# Patient Record
Sex: MALE | Race: WHITE | NOT HISPANIC OR LATINO | Employment: OTHER | URBAN - METROPOLITAN AREA
[De-identification: names, ages, dates, MRNs, and addresses within clinical notes are randomized per-mention and may not be internally consistent; named-entity substitution may affect disease eponyms.]

---

## 2018-12-28 ENCOUNTER — OFFICE VISIT CONVERTED (OUTPATIENT)
Dept: UROLOGY | Facility: CLINIC | Age: 73
End: 2018-12-28
Attending: UROLOGY

## 2019-02-07 ENCOUNTER — HOSPITAL ENCOUNTER (OUTPATIENT)
Dept: ULTRASOUND IMAGING | Facility: HOSPITAL | Age: 74
Discharge: HOME | End: 2019-02-07
Attending: UROLOGY

## 2019-03-06 ENCOUNTER — OFFICE VISIT CONVERTED (OUTPATIENT)
Dept: UROLOGY | Facility: CLINIC | Age: 74
End: 2019-03-06
Attending: UROLOGY

## 2019-10-14 ENCOUNTER — CONVERSION ENCOUNTER (OUTPATIENT)
Dept: SURGERY | Facility: CLINIC | Age: 74
End: 2019-10-14

## 2019-10-14 ENCOUNTER — HOSPITAL ENCOUNTER (OUTPATIENT)
Dept: SURGERY | Facility: CLINIC | Age: 74
Discharge: HOME | End: 2019-10-14
Attending: PHYSICIAN ASSISTANT

## 2019-10-14 ENCOUNTER — OFFICE VISIT CONVERTED (OUTPATIENT)
Dept: SURGERY | Facility: CLINIC | Age: 74
End: 2019-10-14
Attending: PHYSICIAN ASSISTANT

## 2019-10-16 LAB — BACTERIA UR CULT: NORMAL

## 2019-11-08 ENCOUNTER — OFFICE VISIT CONVERTED (OUTPATIENT)
Dept: UROLOGY | Facility: CLINIC | Age: 74
End: 2019-11-08
Attending: UROLOGY

## 2019-12-04 ENCOUNTER — HOSPITAL ENCOUNTER (OUTPATIENT)
Dept: PREADMISSION TESTING | Facility: HOSPITAL | Age: 74
Discharge: HOME | End: 2019-12-04
Attending: UROLOGY

## 2020-01-07 ENCOUNTER — HOSPITAL ENCOUNTER (OUTPATIENT)
Dept: PERIOP | Facility: HOSPITAL | Age: 75
Setting detail: HOSPITAL OUTPATIENT SURGERY
Discharge: HOME | End: 2020-01-07
Attending: UROLOGY

## 2020-01-07 LAB
GLUCOSE BLD-MCNC: 125 MG/DL (ref 70–99)
GLUCOSE BLD-MCNC: 151 MG/DL (ref 70–99)

## 2020-01-13 ENCOUNTER — HOSPITAL ENCOUNTER (OUTPATIENT)
Dept: OTHER | Facility: HOSPITAL | Age: 75
Discharge: HOME | End: 2020-01-13
Attending: UROLOGY

## 2020-01-13 LAB
ANION GAP SERPL CALC-SCNC: 16 MMOL/L (ref 8–19)
BUN SERPL-MCNC: 14 MG/DL (ref 5–25)
BUN/CREAT SERPL: 15 {RATIO} (ref 6–20)
CALCIUM SERPL-MCNC: 9.5 MG/DL (ref 8.7–10.4)
CHLORIDE SERPL-SCNC: 97 MMOL/L (ref 99–111)
CONV CO2: 27 MMOL/L (ref 22–32)
CREAT UR-MCNC: 0.94 MG/DL (ref 0.7–1.2)
GFR SERPLBLD BASED ON 1.73 SQ M-ARVRAT: >60 ML/MIN/{1.73_M2}
GLUCOSE SERPL-MCNC: 139 MG/DL (ref 70–99)
OSMOLALITY SERPL CALC.SUM OF ELEC: 285 MOSM/KG (ref 273–304)
POTASSIUM SERPL-SCNC: 3.9 MMOL/L (ref 3.5–5.3)
SODIUM SERPL-SCNC: 136 MMOL/L (ref 135–147)

## 2020-01-14 ENCOUNTER — HOSPITAL ENCOUNTER (OUTPATIENT)
Dept: NUCLEAR MEDICINE | Facility: HOSPITAL | Age: 75
Discharge: HOME | End: 2020-01-14
Attending: UROLOGY

## 2020-01-14 LAB
ANION GAP SERPL CALC-SCNC: 17 MMOL/L (ref 8–19)
BUN SERPL-MCNC: 14 MG/DL (ref 5–25)
BUN/CREAT SERPL: 14 {RATIO} (ref 6–20)
CALCIUM SERPL-MCNC: 9.4 MG/DL (ref 8.7–10.4)
CHLORIDE SERPL-SCNC: 97 MMOL/L (ref 99–111)
CONV CO2: 25 MMOL/L (ref 22–32)
CREAT UR-MCNC: 1.01 MG/DL (ref 0.7–1.2)
GFR SERPLBLD BASED ON 1.73 SQ M-ARVRAT: >60 ML/MIN/{1.73_M2}
GLUCOSE SERPL-MCNC: 192 MG/DL (ref 70–99)
OSMOLALITY SERPL CALC.SUM OF ELEC: 286 MOSM/KG (ref 273–304)
POTASSIUM SERPL-SCNC: 4.3 MMOL/L (ref 3.5–5.3)
SODIUM SERPL-SCNC: 135 MMOL/L (ref 135–147)

## 2020-01-24 ENCOUNTER — OFFICE VISIT CONVERTED (OUTPATIENT)
Dept: UROLOGY | Facility: CLINIC | Age: 75
End: 2020-01-24
Attending: UROLOGY

## 2020-02-14 ENCOUNTER — OFFICE VISIT CONVERTED (OUTPATIENT)
Dept: SURGERY | Facility: CLINIC | Age: 75
End: 2020-02-14
Attending: SURGERY

## 2020-02-20 ENCOUNTER — HOSPITAL ENCOUNTER (OUTPATIENT)
Dept: PREADMISSION TESTING | Facility: HOSPITAL | Age: 75
Discharge: HOME | End: 2020-02-20
Attending: UROLOGY

## 2020-02-20 LAB
ALBUMIN SERPL-MCNC: 4.5 G/DL (ref 3.5–5)
ALBUMIN/GLOB SERPL: 1.5 {RATIO} (ref 1.4–2.6)
ALP SERPL-CCNC: 57 U/L (ref 56–155)
ALT SERPL-CCNC: 23 U/L (ref 10–40)
ANION GAP SERPL CALC-SCNC: 19 MMOL/L (ref 8–19)
AST SERPL-CCNC: 24 U/L (ref 15–50)
BASOPHILS # BLD AUTO: 0.04 10*3/UL (ref 0–0.2)
BASOPHILS NFR BLD AUTO: 0.5 % (ref 0–3)
BILIRUB SERPL-MCNC: 0.67 MG/DL (ref 0.2–1.3)
BUN SERPL-MCNC: 15 MG/DL (ref 5–25)
BUN/CREAT SERPL: 15 {RATIO} (ref 6–20)
CALCIUM SERPL-MCNC: 9.6 MG/DL (ref 8.7–10.4)
CHLORIDE SERPL-SCNC: 100 MMOL/L (ref 99–111)
CONV ABS IMM GRAN: 0.02 10*3/UL (ref 0–0.2)
CONV CO2: 24 MMOL/L (ref 22–32)
CONV IMMATURE GRAN: 0.3 % (ref 0–1.8)
CONV TOTAL PROTEIN: 7.6 G/DL (ref 6.3–8.2)
CREAT UR-MCNC: 1.02 MG/DL (ref 0.7–1.2)
DEPRECATED RDW RBC AUTO: 44 FL (ref 35.1–43.9)
EOSINOPHIL # BLD AUTO: 0.19 10*3/UL (ref 0–0.7)
EOSINOPHIL # BLD AUTO: 2.4 % (ref 0–7)
ERYTHROCYTE [DISTWIDTH] IN BLOOD BY AUTOMATED COUNT: 12.8 % (ref 11.6–14.4)
GFR SERPLBLD BASED ON 1.73 SQ M-ARVRAT: >60 ML/MIN/{1.73_M2}
GLOBULIN UR ELPH-MCNC: 3.1 G/DL (ref 2–3.5)
GLUCOSE SERPL-MCNC: 148 MG/DL (ref 70–99)
HCT VFR BLD AUTO: 41.2 % (ref 42–52)
HGB BLD-MCNC: 13.8 G/DL (ref 14–18)
INR PPP: 0.94 (ref 2–3)
LYMPHOCYTES # BLD AUTO: 2.45 10*3/UL (ref 1–5)
LYMPHOCYTES NFR BLD AUTO: 30.7 % (ref 20–45)
MCH RBC QN AUTO: 31.2 PG (ref 27–31)
MCHC RBC AUTO-ENTMCNC: 33.5 G/DL (ref 33–37)
MCV RBC AUTO: 93 FL (ref 80–96)
MONOCYTES # BLD AUTO: 0.7 10*3/UL (ref 0.2–1.2)
MONOCYTES NFR BLD AUTO: 8.8 % (ref 3–10)
NEUTROPHILS # BLD AUTO: 4.59 10*3/UL (ref 2–8)
NEUTROPHILS NFR BLD AUTO: 57.3 % (ref 30–85)
NRBC CBCN: 0 % (ref 0–0.7)
OSMOLALITY SERPL CALC.SUM OF ELEC: 292 MOSM/KG (ref 273–304)
PLATELET # BLD AUTO: 228 10*3/UL (ref 130–400)
PMV BLD AUTO: 10.3 FL (ref 9.4–12.4)
POTASSIUM SERPL-SCNC: 4.3 MMOL/L (ref 3.5–5.3)
PROTHROMBIN TIME: 10.3 S (ref 9.4–12)
RBC # BLD AUTO: 4.43 10*6/UL (ref 4.7–6.1)
SODIUM SERPL-SCNC: 139 MMOL/L (ref 135–147)
WBC # BLD AUTO: 7.99 10*3/UL (ref 4.8–10.8)

## 2020-02-22 LAB — BACTERIA UR CULT: NORMAL

## 2020-03-05 ENCOUNTER — HOSPITAL ENCOUNTER (OUTPATIENT)
Dept: PERIOP | Facility: HOSPITAL | Age: 75
Setting detail: HOSPITAL OUTPATIENT SURGERY
Discharge: HOME | End: 2020-03-06
Attending: UROLOGY

## 2020-03-05 LAB
ABO GROUP BLD: NORMAL
BLD GP AB SCN SERPL QL: NORMAL
CONV ABD CONTROL: NORMAL
GLUCOSE BLD-MCNC: 137 MG/DL (ref 70–99)
GLUCOSE BLD-MCNC: 175 MG/DL (ref 70–99)
GLUCOSE BLD-MCNC: 194 MG/DL (ref 70–99)
GLUCOSE BLD-MCNC: 204 MG/DL (ref 70–99)
GLUCOSE BLD-MCNC: 207 MG/DL (ref 70–99)
RH BLD: NORMAL

## 2020-03-06 LAB
ANION GAP SERPL CALC-SCNC: 18 MMOL/L (ref 8–19)
BASOPHILS # BLD AUTO: 0.03 10*3/UL (ref 0–0.2)
BASOPHILS NFR BLD AUTO: 0.2 % (ref 0–3)
BUN SERPL-MCNC: 11 MG/DL (ref 5–25)
BUN/CREAT SERPL: 11 {RATIO} (ref 6–20)
CALCIUM SERPL-MCNC: 8.9 MG/DL (ref 8.7–10.4)
CHLORIDE SERPL-SCNC: 97 MMOL/L (ref 99–111)
CONV ABS IMM GRAN: 0.05 10*3/UL (ref 0–0.2)
CONV CO2: 24 MMOL/L (ref 22–32)
CONV IMMATURE GRAN: 0.4 % (ref 0–1.8)
CREAT FLD-MCNC: 0.9 MG/DL
CREAT UR-MCNC: 1 MG/DL (ref 0.7–1.2)
DEPRECATED RDW RBC AUTO: 44.1 FL (ref 35.1–43.9)
EOSINOPHIL # BLD AUTO: 0 % (ref 0–7)
EOSINOPHIL # BLD AUTO: 0 10*3/UL (ref 0–0.7)
ERYTHROCYTE [DISTWIDTH] IN BLOOD BY AUTOMATED COUNT: 12.9 % (ref 11.6–14.4)
GFR SERPLBLD BASED ON 1.73 SQ M-ARVRAT: >60 ML/MIN/{1.73_M2}
GLUCOSE BLD-MCNC: 133 MG/DL (ref 70–99)
GLUCOSE BLD-MCNC: 144 MG/DL (ref 70–99)
GLUCOSE SERPL-MCNC: 159 MG/DL (ref 70–99)
HCT VFR BLD AUTO: 38.8 % (ref 42–52)
HGB BLD-MCNC: 13 G/DL (ref 14–18)
LYMPHOCYTES # BLD AUTO: 1.78 10*3/UL (ref 1–5)
LYMPHOCYTES NFR BLD AUTO: 14.3 % (ref 20–45)
MCH RBC QN AUTO: 31.2 PG (ref 27–31)
MCHC RBC AUTO-ENTMCNC: 33.5 G/DL (ref 33–37)
MCV RBC AUTO: 93 FL (ref 80–96)
MONOCYTES # BLD AUTO: 1.32 10*3/UL (ref 0.2–1.2)
MONOCYTES NFR BLD AUTO: 10.6 % (ref 3–10)
NEUTROPHILS # BLD AUTO: 9.26 10*3/UL (ref 2–8)
NEUTROPHILS NFR BLD AUTO: 74.5 % (ref 30–85)
NRBC CBCN: 0 % (ref 0–0.7)
OSMOLALITY SERPL CALC.SUM OF ELEC: 281 MOSM/KG (ref 273–304)
PLATELET # BLD AUTO: 239 10*3/UL (ref 130–400)
PMV BLD AUTO: 10.3 FL (ref 9.4–12.4)
POTASSIUM SERPL-SCNC: 5 MMOL/L (ref 3.5–5.3)
RBC # BLD AUTO: 4.17 10*6/UL (ref 4.7–6.1)
SODIUM SERPL-SCNC: 134 MMOL/L (ref 135–147)
SPECIMEN SOURCE FLD: NORMAL
WBC # BLD AUTO: 12.44 10*3/UL (ref 4.8–10.8)

## 2020-03-13 ENCOUNTER — OFFICE VISIT CONVERTED (OUTPATIENT)
Dept: UROLOGY | Facility: CLINIC | Age: 75
End: 2020-03-13
Attending: UROLOGY

## 2020-05-12 ENCOUNTER — TELEMEDICINE CONVERTED (OUTPATIENT)
Dept: UROLOGY | Facility: CLINIC | Age: 75
End: 2020-05-12
Attending: UROLOGY

## 2020-05-23 ENCOUNTER — HOSPITAL ENCOUNTER (OUTPATIENT)
Dept: URGENT CARE | Facility: CLINIC | Age: 75
Discharge: HOME | End: 2020-05-23
Attending: EMERGENCY MEDICINE

## 2020-08-21 ENCOUNTER — OFFICE VISIT CONVERTED (OUTPATIENT)
Dept: UROLOGY | Facility: CLINIC | Age: 75
End: 2020-08-21
Attending: UROLOGY

## 2020-09-24 ENCOUNTER — TELEPHONE CONVERTED (OUTPATIENT)
Dept: UROLOGY | Facility: CLINIC | Age: 75
End: 2020-09-24
Attending: UROLOGY

## 2021-01-15 ENCOUNTER — TELEPHONE CONVERTED (OUTPATIENT)
Dept: UROLOGY | Facility: CLINIC | Age: 76
End: 2021-01-15
Attending: UROLOGY

## 2021-04-12 ENCOUNTER — TELEPHONE CONVERTED (OUTPATIENT)
Dept: UROLOGY | Facility: CLINIC | Age: 76
End: 2021-04-12
Attending: UROLOGY

## 2021-04-19 ENCOUNTER — CONVERSION ENCOUNTER (OUTPATIENT)
Dept: GASTROENTEROLOGY | Facility: CLINIC | Age: 76
End: 2021-04-19

## 2021-04-19 ENCOUNTER — OFFICE VISIT CONVERTED (OUTPATIENT)
Dept: GASTROENTEROLOGY | Facility: CLINIC | Age: 76
End: 2021-04-19
Attending: NURSE PRACTITIONER

## 2021-05-11 NOTE — H&P
History and Physical      Patient Name: Romel Pérez   Patient ID: [id number]   Sex: Male   YOB: 1945    Primary Care Provider: Romaine Baird MD   Referring Provider: Romaine Baird MD    Visit Date: April 19, 2021    Provider: GRAEME Hudson   Location: [facility]   Location Address: [address] [place] [address]  [id number]   Location Phone: [phone]          Chief Complaint  · Colonoscopy Consult      History Of Present Illness  Romel Pérez is a 75 year old /White male who presents to the office today.      Last colonoscopy 2018, pt had 11 adenomatous polyps removed.   Pt states he was dx w prostate cancer 2019 so he did not f/u with colonoscopy d/t that, later had prostatectomy, Dr Baron.     Pt states he has seen blood if he's straining, otherwise normal BM's.  NO abd pain usually, no pain w meals. Takes Omeprazole, hx EGD 2016 showed Class B reflux esophagitis, neg. IM. Stable weight.    Pt has seen Dr Love for c/o CP. Pt had negative stress test per pt. Pt states he was later dx w shingles but he does have a f/u with him.       Past Medical History  Balanitis; Colon Polyps; Diabetes Mellitus, Type II; ED; Gastroesophageal Reflux Disorder; High blood pressure; High cholesterol; Prostate cancer         Past Surgical History  Colonoscopy; GI endoscopic ultrasound examination; Knee replacement, left; Knee replacement, right; Robotic-assisted prostatectomy with pelvic lymph node dissection; skin cancer removed         Medication List  Name Date Started Instructions   aspirin 81 mg oral tablet,delayed release (DR/EC)  take 1 tablet (81 mg) by oral route once daily   Fiber (psyllium husk) 0.4 gram oral capsule  take 1 capsule by oral route daily   fiber chewable  --    hydrochlorothiazide 12.5 mg oral capsule  take 1 capsule (12.5 mg) by oral route once daily   magnesium oral  --    Men's Multivitamin 400- mcg oral tablet  take 1 tablet  "by oral route daily   Metamucil 0.52 gram oral capsule  --    metformin 500 mg oral tablet  take 1 tablet (500 mg) by oral route 2 times per day with morning and evening meals   metoprolol tartrate 50 mg oral tablet  --    multivitamin  --    omeprazole 20 mg oral tablet,delayed release (DR/EC)  --    simvastatin 20 mg oral tablet  take 1 tablet (20 mg) by oral route once daily in the evening         Allergy List  NO KNOWN DRUG ALLERGIES         Family Medical History  Disease Name Relative/Age Notes   Hypertension Mother/   --    Family history of breast cancer in mother Mother/70s   --    Family history of colorectal cancer Mother/70s   --          Social History  Alcohol (Never); Tobacco (Never)         Review of Systems  · Constitutional  o Admits  o : good general health lately, no acute distress  · Gastrointestinal  o Denies  o : additional gastrointestinal symptoms except as noted in the HPI  · Psychiatric  o Admits  o : pleasant affect      Vitals  Date Time BP Position Site L\R Cuff Size HR RR TEMP (F) WT  HT  BMI kg/m2 BSA m2 O2 Sat FR L/min FiO2 HC       08/21/2020 08:29 AM       17  238lbs 2oz 6'  2\" 30.57 2.37       04/19/2021 02:14 /84 Sitting    69 - R  98.6 243lbs 16oz 6'  2\" 31.33 2.4             Physical Examination  · Constitutional  o Appearance  o : well developed, well-nourished, in no acute distress  · Head and Face  o Head  o :   § Inspection  § : atraumatic, normocephalic  · Eyes  o Sclerae  o : sclerae white, no sclerae icterus  · Neck  o Inspection/Palpation  o : supple  · Respiratory  o Respiratory Effort  o : breathing unlabored  o Inspection of Chest  o : normal appearance, no retractions  · Cardiovascular  o Peripheral Vascular System  o :   § Extremities  § : no cyanosis, clubbing or edema  · Gastrointestinal  o Abdominal Examination  o : soft, nontender to palpation  · Skin and Subcutaneous Tissue  o General Inspection  o : no lesions present, no rashes " present  · Neurologic  o Mental Status Examination  o :   § Orientation  § : grossly oriented to person, place and time  § Speech/Language  § : communication ability within normal limits, voice quality normal, articulation of speech normal, no evidence of aphasia  § Attention  § : attention normal, concentration abilities normal  o Sensation  o : grossly intact  o Gait and Station  o :   § Gait Screening  § : normal gait  · Psychiatric  o General  o : Alert and oriented x3  o Mood and Affect  o : Mood and affect are appropriate to circumstances          Assessment  · Pre-op exam     V72.84/Z01.818  · Constipation     564.00/K59.00  · Family history of colon cancer     V16.0/Z80.0  · Heartburn     787.1/R12  · History of colon polyps     V12.72/Z86.010      Plan  · Medications  o Golytely 236-22.74-6.74 -5.86 gram oral recon soln   SIG: take as directed   DISP: (1) Box with 0 refills  Prescribed on 04/19/2021     o Medications have been Reconciled  o Transition of Care or Provider Policy  · Instructions  o Please Sign Permit for: Colonoscopy  o Indication: hx colon polyps, fam hx colon cancer  o Surgical Risk and Benefits: Possible risks/complications, benefits, and alternatives to surgical or invasive procedure have been explained to patient and/or legal guardian; Patient has been evaluated and can tolerate anesthesia and/or sedation. Risks, benefits, and alternatives to anesthesia and sedation have been explained to patient and/or legal guardian.  o Follow Up after Procedure.  o Other Instructions: Clearance Dr Love  o Encouraged to follow-up with Primary Care Provider for preventative care.  o Patient was educated/instructed on their diagnosis, treatment and medications prior to discharge from the clinic today.  o Patient instructed to seek medical attention urgently for new or worsening symptoms.  o Colace BID  o I d/w pt there were no screening guidelines after age 75, but he would like to proceed. He  appears in good health.             Electronically Signed by: GRAEME Hudson -Author on April 19, 2021 03:12:24 PM

## 2021-05-13 NOTE — PROGRESS NOTES
Progress Note      Patient Name: Romel Pérez   Patient ID: [id number]   Sex: Male   YOB: 1945    Primary Care Provider: Romaine Baird MD   Referring Provider: Warner Baron MD    Visit Date: August 21, 2020    Provider: Warner Baron MD   Location: [facility]   Location Address: [address] [place] [address]  [id number]   Location Phone: [phone]          Chief Complaint  · pt here for urologic issues      History Of Present Illness  Video Conferencing Visit  Romel Pérez is a 75 year old /White male who is presenting for evaluation via video conferencing. Verbal consent obtained before beginning visit.   The following staff were present during this visit: Kari Alexa      58     Prostate CA    5/20 >75-year-old  gentleman here today s/p RALP LND  3/20, [id number]    Comes in today with balanitis.  And balanitis for the last several weeks.  Very sore and red.  He is using triamcinolone/nystatin cream    6+ pads daily.  Patient using a pad every 2 hours, even at night.  Doing Kegels.  NO GH, No straining.     8/20 urine culture negative    PVR today 74    Previous    ED, is worried about this    Patient denies AMI and CVA, non-smoker, aspirin 81, diabetes mellitus treated with metformin    Father lived to be 77, had pulmonary issues from occupational problems.    No h/o abdominal surgeries    1/20 creatinine 0.9, GFR > 60     Prostate CA    5/20  <0.01    3/5/20  RALP with LND   4+5 = 9, 3 lymph nodes negative, prostatic adenocarcinoma, [id number]    1/20 bone scannegative  1/20 CT abdomen/pelvis withsmall hernia at the left lateral aspect of the inferior left rectus muscle containing a small focus of colon.  No signs of lymphadenopathy or metastatic disease    1/7/2020 MRI fusion prostate biopsy  Right base, left base, right mid, left mid, right apexnegative  Left apex4+5, 1/2, 30%  Region of interest4+3, 4/4, 48%    10/19 MRI [id number], lesion in the left  "lateral peripheral zone at the apex PIRADS 5.  Size is 20 mm x 11 mm no gina extraprostatic extension seen.  Margin does abut prostatic capsule.  11/19    8.6, percent free 10.1% (40% chance)  - on finasteride  9/19:    10.041  3/2014:  1.42  2/2013   1.00    7/2011:  0.60   10/2010:  0.58   10/2009:  .49   10/2008  .48   10/2007 1.388  2006:    1.31       Past Medical History  Diabetes Mellitus, Type II; ED; Gastroesophageal Reflux Disorder; High blood pressure; High cholesterol; Prostate cancer         Past Surgical History  Colonoscopy; GI endoscopic ultrasound examination; Knee replacement, left; Knee replacement, right; Robotic-assisted prostatectomy with pelvic lymph node dissection; skin cancer removed         Medication List  aspirin 81 mg oral tablet,delayed release (DR/EC); Fiber (psyllium husk) 0.4 gram oral capsule; fiber chewable; finasteride 5 mg oral tablet; hydrochlorothiazide 12.5 mg oral capsule; magnesium oral; Metamucil 0.52 gram oral capsule; metformin 500 mg oral tablet; metoprolol tartrate 50 mg oral tablet; multivitamin; nystatin-triamcinolone 100,000-0.1 unit/g-% topical cream; omeprazole 20 mg oral tablet,delayed release (DR/EC); simvastatin 20 mg oral tablet; Vitamin E         Allergy List  NO KNOWN DRUG ALLERGIES         Family Medical History  Hypertension; Colon Cancer         Social History  Tobacco (Never)         Review of Systems  · Constitutional  o Denies  o : chills  · Respiratory  o Denies  o : cough  · Gastrointestinal  o Denies  o : nausea      Vitals  Date Time BP Position Site L\R Cuff Size HR RR TEMP (F) WT  HT  BMI kg/m2 BSA m2 O2 Sat HC       08/21/2020 08:29 AM       17  238lbs 2oz 6'  2\" 30.57 2.37           Physical Examination  · Constitutional  o Appearance  o : Well-appearing, well-developed, in no acute distress  · Neurologic  o Mental Status Examination  o :   § Orientation  § : Grossly oriented to person, place and time, judgment and insight intact, normal mood " and affect       Circumcised phallus, red inflamed meatus and foreskin           Results  · In-Office Procedures  o Surgical procedure  § IOP - Bladder Scan/Residual Urine (27420)   § Specimen vol Ur: 76   o Lab procedure  § Automated Dipstick Urinalysis (Surg Spec) WITHOUT Micro HMH (82482)   § Color Ur: Yellow   § Clarity Ur: Clear   § Glucose Ur Ql Strip: Negative   § Bilirub Ur Ql Strip: Negative   § Ketones Ur Ql Strip: Negative   § Sp Gr Ur Qn: 1.025   § Hgb Ur Ql Strip: Small   § pH Ur-LsCnc: 5.5   § Prot Ur Ql Strip: 30 mg/dL   § Urobilinogen Ur Strip-mCnc: 0.2 E.U./dL   § Nitrite Ur Ql Strip: Negative   § WBC Est Ur Ql Strip: Trace       Assessment  · Prostate cancer     185/C61  · Stress incontinence of urine     NOCODE/N39.3  · Balanitis     607.1/N48.1    Problems Reconciled  Plan  · Medications  o Medications have been Reconciled  o Transition of Care or Provider Policy  · Instructions  o Electronically Identified Patient Education Materials Provided Electronically       Continue Kegel exercises, still very bothered by stress incontinence, we will continue to monitor this conservatively    Balanitis very severe, he will start drying the penis with a hair dryer twice daily and placing triamcinolone/nystatin cream on the penis 3 times daily.      He will let me know if his things do not get better in a month or so.  We did discuss we could move forward with circumcision if this does not take care of the problem    He has follow-up already with a PSA in a few months.        Greater than 15 minutes was used in counseling and coordination of care, with greater than 51% of this in face-to-face counseling               Electronically Signed by: Warner Baron MD -Author on August 21, 2020 09:17:54 AM

## 2021-05-13 NOTE — PROGRESS NOTES
Progress Note      Patient Name: Romel Pérez   Patient ID: [id number]   Sex: Male   YOB: 1945    Primary Care Provider: Romaine Baird MD   Referring Provider: Warner Baron MD    Visit Date: September 24, 2020    Provider: Warner Baron MD   Location: [facility]   Location Address: [address] [place] [address]  [id number]   Location Phone: [phone]          Chief Complaint  · pt here for urologic issues      History Of Present Illness  TELEHEALTH TELEPHONE VISIT  Romel Pérez is a 75 year old /White male who is presenting for evaluation via telehealth telephone visit. Verbal consent obtained before beginning visit.   Provider spent 15 minutes with the patient during the telehealth visit.   The following staff were present during this visit: Dandy Aden   Past Medical History/ Overview of Patient Symptoms     58     Prostate CA    9/20 >

## 2021-05-14 VITALS
TEMPERATURE: 98.6 F | DIASTOLIC BLOOD PRESSURE: 84 MMHG | SYSTOLIC BLOOD PRESSURE: 184 MMHG | HEART RATE: 69 BPM | HEIGHT: 74 IN | WEIGHT: 244 LBS | BODY MASS INDEX: 31.32 KG/M2

## 2021-05-14 NOTE — PROGRESS NOTES
Progress Note      Patient Name: Romel Pérez   Patient ID: [id number]   Sex: Male   YOB: 1945    Primary Care Provider: Romaine Baird MD   Referring Provider: Warner Baron MD    Visit Date: January 15, 2021    Provider: Warner Baron MD   Location: [facility]   Location Address: [address] [place] [address]  [id number]   Location Phone: [phone]          Chief Complaint  · urological issues      History Of Present Illness  TELEHEALTH TELEPHONE VISIT  Romel Pérez is a 75 year old /White male who is presenting for evaluation via telehealth telephone visit. Verbal consent obtained before beginning visit.   Provider spent 11 minutes with the patient during the telehealth visit.   The following staff were present during this visit: Dandy Aden   Past Medical History/ Overview of Patient Symptoms     58     Prostate CA    9/20 >

## 2021-05-14 NOTE — PROGRESS NOTES
Progress Note      Patient Name: Romel Pérez   Patient ID: [id number]   Sex: Male   YOB: 1945    Primary Care Provider: Romaine Baird MD   Referring Provider: Warner Baron MD    Visit Date: April 12, 2021    Provider: Warner Baron MD   Location: [facility]   Location Address: [address] [place] [address]  [id number]   Location Phone: [phone]          Chief Complaint  · pt here for urologic issues      History Of Present Illness  TELEHEALTH TELEPHONE VISIT  Romel Pérez is a 75 year old /White male who is presenting for evaluation via telehealth telephone visit. Verbal consent obtained before beginning visit.   Provider spent 12 minutes with the patient during the telehealth visit.   The following staff were present during this visit: Dandy Aden   Past Medical History/ Overview of Patient Symptoms     58     Prostate CA    4/21  >

## 2021-05-15 VITALS — WEIGHT: 240 LBS | HEIGHT: 74 IN | BODY MASS INDEX: 30.8 KG/M2 | RESPIRATION RATE: 16 BRPM

## 2021-05-15 VITALS — BODY MASS INDEX: 30.67 KG/M2 | RESPIRATION RATE: 14 BRPM | WEIGHT: 239 LBS | HEIGHT: 74 IN

## 2021-05-15 VITALS — HEIGHT: 74 IN | WEIGHT: 241 LBS | RESPIRATION RATE: 16 BRPM | BODY MASS INDEX: 30.93 KG/M2

## 2021-05-15 VITALS — WEIGHT: 239.25 LBS | RESPIRATION RATE: 14 BRPM | BODY MASS INDEX: 30.7 KG/M2 | HEIGHT: 74 IN

## 2021-05-15 VITALS — WEIGHT: 238.12 LBS | HEIGHT: 74 IN | BODY MASS INDEX: 30.56 KG/M2 | RESPIRATION RATE: 17 BRPM

## 2021-05-15 VITALS — HEIGHT: 74 IN | BODY MASS INDEX: 30.8 KG/M2 | WEIGHT: 240 LBS | RESPIRATION RATE: 19 BRPM

## 2021-05-16 VITALS — RESPIRATION RATE: 14 BRPM | BODY MASS INDEX: 30.54 KG/M2 | WEIGHT: 238 LBS | HEIGHT: 74 IN

## 2021-05-16 VITALS — HEIGHT: 74 IN | WEIGHT: 238.25 LBS | RESPIRATION RATE: 16 BRPM | BODY MASS INDEX: 30.58 KG/M2

## 2021-06-03 ENCOUNTER — HOSPITAL ENCOUNTER (OUTPATIENT)
Dept: GASTROENTEROLOGY | Facility: HOSPITAL | Age: 76
Setting detail: HOSPITAL OUTPATIENT SURGERY
Discharge: HOME | End: 2021-06-03
Attending: INTERNAL MEDICINE

## 2021-06-03 LAB — GLUCOSE BLD-MCNC: 135 MG/DL (ref 70–99)

## 2021-10-13 ENCOUNTER — TRANSCRIBE ORDERS (OUTPATIENT)
Dept: UROLOGY | Facility: CLINIC | Age: 76
End: 2021-10-13

## 2021-10-13 ENCOUNTER — LAB (OUTPATIENT)
Dept: LAB | Facility: HOSPITAL | Age: 76
End: 2021-10-13

## 2021-10-13 DIAGNOSIS — C61 MALIGNANT NEOPLASM OF PROSTATE (HCC): Primary | ICD-10-CM

## 2021-10-13 DIAGNOSIS — C61 MALIGNANT NEOPLASM OF PROSTATE (HCC): ICD-10-CM

## 2021-10-13 LAB — PSA SERPL-MCNC: <0.014 NG/ML (ref 0–4)

## 2021-10-13 PROCEDURE — 36415 COLL VENOUS BLD VENIPUNCTURE: CPT

## 2021-10-13 PROCEDURE — 84153 ASSAY OF PSA TOTAL: CPT

## 2021-10-28 PROBLEM — C61 PROSTATE CANCER (HCC): Status: ACTIVE | Noted: 2021-10-28

## 2021-10-28 NOTE — PROGRESS NOTES
Chief Complaint    Urologic complaint    Subjective          Romelmonty Pérez presents to [facility]  History of Present Illness    76-year-old  gentleman here today s/p RALP LND  3/20, [id number]    Incontinence unchanged. Changes pad Q 1.5  - 2 hrsDoing Kegels.  maybe a little better.    h/o balanitis. used triamcinolone/nystatin cream.  bot bothersome at this time    No GH/UTI    PVR     8/20  74    Not worried about ED. Minimal sensation.    Previous    Tried [place] clam - hard to use.    Myrbetriq 25 -did not help, caused constipation    Patient denies AMI and CVA, non-smoker, aspirin 81, diabetes mellitus treated with metformin    Father lived to be 77, had pulmonary issues from occupational problems.    No h/o abdominal surgeries    1/20 creatinine 0.9, GFR > 60     Prostate CA    10/21 <0.014  4/21   <0.01  9/20   <0.13  5/20   <0.01    3/5/20  RALP with LND   4+5 = 9, 3 lymph nodes negative, prostatic adenocarcinoma, [id number]    1/20 bone scan negative  1/20 CT abdomen/pelvis with small hernia at the left lateral aspect of the inferior left rectus muscle containing a small focus of colon.  No signs of lymphadenopathy or metastatic disease    1/7/2020 MRI fusion prostate biopsy  Right base, left base, right mid, left mid, right apex negative  Left apex 4+5, 1/2, 30%  Region of interest 4+3, 4/4, 48%    10/19 MRI prostate 56, lesion in the left lateral peripheral zone at the apex PI RADS 5.  Size is 20 mm x 11 mm no gina extraprostatic extension seen.  Margin does abut prostatic capsule.  11/19    8.6, percent free 10.1% (40% chance)  - on finasteride  9/19:    10.041  3/2014:  1.42  2/2013   1.00    7/2011:  0.60   10/2010:  0.58   10/2009:  .49   10/2008  .48   10/2007 1.388  2006:    1.31     Past History:  Medical History: has no past medical history on file.   Surgical History: has no past surgical history on file.   Family History: family history is not on file.   Social  History:   Allergies: Patient has no allergy information on record.     No current outpatient medications on file.     Physical exam       Alert and orient x3  Well appearing, well developed, in no acute distress   Unlabored respirations  Grossly oriented to person, place and time, judgment is intact, normal mood and affect    Results for orders placed or performed in visit on 10/13/21   PSA DIAGNOSTIC    Specimen: Blood   Result Value Ref Range    PSA <0.014 0.000 - 4.000 ng/mL        Objective     Vital Signs:   There were no vitals taken for this visit.             Assessment and Plan    Diagnoses and all orders for this visit:    1. Prostate cancer (HCC) (Primary)      RASHID    Not interested in AUS    H/o Prostate cancer    PSA undetectable, patient given reassurance    NP in 1 yr with PSA

## 2021-10-29 ENCOUNTER — OFFICE VISIT (OUTPATIENT)
Dept: UROLOGY | Facility: CLINIC | Age: 76
End: 2021-10-29

## 2021-10-29 VITALS — HEIGHT: 74 IN | BODY MASS INDEX: 31.32 KG/M2 | WEIGHT: 244 LBS | RESPIRATION RATE: 17 BRPM

## 2021-10-29 DIAGNOSIS — C61 PROSTATE CANCER (HCC): Primary | ICD-10-CM

## 2021-10-29 PROCEDURE — 99213 OFFICE O/P EST LOW 20 MIN: CPT | Performed by: UROLOGY

## 2021-10-29 RX ORDER — FLUTICASONE PROPIONATE 50 MCG
1 SPRAY, SUSPENSION (ML) NASAL DAILY
COMMUNITY
Start: 2021-08-02

## 2021-10-29 RX ORDER — OMEPRAZOLE 40 MG/1
40 CAPSULE, DELAYED RELEASE ORAL DAILY
COMMUNITY

## 2021-10-29 RX ORDER — MAGNESIUM GLUCONATE 27 MG(500)
27 TABLET ORAL 2 TIMES DAILY
COMMUNITY

## 2021-10-29 RX ORDER — FINASTERIDE 5 MG/1
5 TABLET, FILM COATED ORAL DAILY
COMMUNITY

## 2021-10-29 RX ORDER — HYDROCHLOROTHIAZIDE 12.5 MG/1
12.5 CAPSULE, GELATIN COATED ORAL DAILY
COMMUNITY

## 2021-10-29 RX ORDER — CHLORCYCLIZINE HYDROCHLORIDE AND PSEUDOEPHEDRINE HYDROCHLORIDE 25; 60 MG/1; MG/1
TABLET ORAL
COMMUNITY
Start: 2021-08-02

## 2021-10-29 RX ORDER — SIMVASTATIN 10 MG
10 TABLET ORAL NIGHTLY
COMMUNITY

## 2021-10-29 RX ORDER — ASPIRIN 81 MG/1
81 TABLET, CHEWABLE ORAL DAILY
COMMUNITY

## 2021-10-29 RX ORDER — METOPROLOL SUCCINATE 100 MG/1
100 TABLET, EXTENDED RELEASE ORAL DAILY
COMMUNITY

## 2022-09-13 ENCOUNTER — TELEPHONE (OUTPATIENT)
Dept: UROLOGY | Facility: CLINIC | Age: 77
End: 2022-09-13

## 2022-09-13 DIAGNOSIS — C61 PROSTATE CANCER: Primary | ICD-10-CM

## 2022-09-13 NOTE — TELEPHONE ENCOUNTER
----- Message from Aria Dinh sent at 9/12/2022  4:43 PM EDT -----  Regarding: PSA ORDER  PT IS SUPPOSED TO HAVE A PSA DONE BEFORE HIS APPT, CAN YOU PUT THAT ORDER IN PLEASE, THANK YOU.

## 2022-11-01 ENCOUNTER — LAB (OUTPATIENT)
Dept: LAB | Facility: HOSPITAL | Age: 77
End: 2022-11-01

## 2022-11-01 DIAGNOSIS — C61 PROSTATE CANCER: ICD-10-CM

## 2022-11-01 PROCEDURE — 84153 ASSAY OF PSA TOTAL: CPT

## 2022-11-01 PROCEDURE — 36415 COLL VENOUS BLD VENIPUNCTURE: CPT

## 2022-11-02 LAB — PSA SERPL-MCNC: 0.02 NG/ML (ref 0–4)

## 2022-11-14 PROBLEM — R97.20 ELEVATED PSA: Status: ACTIVE | Noted: 2022-11-14

## 2022-11-14 NOTE — PROGRESS NOTES
Chief Complaint    Urologic complaint    Subjective          Romel Pérez presents to [facility]  History of Present Illness      77-year-old  gentleman       Prostatic adenocarcinoma   RALP LND  3/20, [id number]      Incontinence about the same.  Doing Kegels.  A little better at night    No GH    No major changes to his medical history, follows up today with a PSA    PVR     8/20  74    Not worried about ED. No change        Previous    Not interested in AUS    h/o balanitis. used triamcinolone/nystatin cream.  bot bothersome at this time    Tried [place] clam - hard to use.    Myrbetriq 25 -did not help, caused constipation    Patient denies AMI and CVA, non-smoker, aspirin 81, diabetes mellitus treated with metformin    Father lived to be 77, had pulmonary issues from occupational problems.    No h/o abdominal surgeries    1/20 creatinine 0.9, GFR > 60     Prostate CA      11/22       0.024   10/21      <0.014  4/21        <0.01  9/20      <0.13  5/20       <0.01    3/5/20  RALP with LND   4+5 = 9, 3 lymph nodes negative, prostatic adenocarcinoma, [id number]    1/20 bone scan negative  1/20 CT abdomen/pelvis with small hernia at the left lateral aspect of the inferior left rectus muscle containing a small focus of colon.  No signs of lymphadenopathy or metastatic disease    1/7/2020 MRI fusion prostate biopsy  Right base, left base, right mid, left mid, right apex negative  Left apex 4+5, 1/2, 30%  Region of interest 4+3, 4/4, 48%    10/19 MRI prostate 56, lesion in the left lateral peripheral zone at the apex PI RADS 5.  Size is 20 mm x 11 mm no gina extraprostatic extension seen.  Margin does abut prostatic capsule.  11/19    8.6, percent free 10.1% (40% chance)  - on finasteride  9/19:    10.041  3/2014:  1.42  2/2013   1.00    7/2011:  0.60   10/2010:  0.58   10/2009:  .49   10/2008  .48   10/2007 1.388  2006:    1.31     Past History:  Medical History: has no past medical  history on file.   Surgical History: has no past surgical history on file.   Family History: family history is not on file.   Social History: reports that he has never smoked. He has never used smokeless tobacco.  Allergies: Patient has no known allergies.       Current Outpatient Medications:   •  aspirin 81 MG chewable tablet, Chew 81 mg Daily., Disp: , Rfl:   •  finasteride (PROSCAR) 5 MG tablet, Take 5 mg by mouth Daily., Disp: , Rfl:   •  fluticasone (FLONASE) 50 MCG/ACT nasal spray, 1 spray by Each Nare route Daily. Shake before using., Disp: , Rfl:   •  hydroCHLOROthiazide (MICROZIDE) 12.5 MG capsule, Take 12.5 mg by mouth Daily., Disp: , Rfl:   •  magnesium gluconate (MAGONATE) 500 MG tablet, Take 27 mg by mouth 2 (Two) Times a Day., Disp: , Rfl:   •  metFORMIN (GLUCOPHAGE) 1000 MG tablet, Take 1,000 mg by mouth 2 (Two) Times a Day With Meals., Disp: , Rfl:   •  metoprolol succinate XL (TOPROL-XL) 100 MG 24 hr tablet, Take 100 mg by mouth Daily., Disp: , Rfl:   •  omeprazole (priLOSEC) 40 MG capsule, Take 40 mg by mouth Daily., Disp: , Rfl:   •  psyllium (METAMUCIL) 58.6 % packet, Take 1 packet by mouth Daily., Disp: , Rfl:   •  simvastatin (ZOCOR) 10 MG tablet, Take 10 mg by mouth Every Night., Disp: , Rfl:   •  Stahist AD 25-60 MG tablet, TAKE 1 TABLET BY MOUTH EVERY 6 HOURS AS NEEDED FOR COLD OR SINUS OR SYMPTOMS, Disp: , Rfl:          Results for orders placed or performed in visit on 11/01/22   PSA Diagnostic    Specimen: Blood   Result Value Ref Range    PSA 0.024 0.000 - 4.000 ng/mL        Objective     Vital Signs:   There were no vitals taken for this visit.             Assessment and Plan    Diagnoses and all orders for this visit:    1. Elevated PSA (Primary)        H/o Prostate cancer    PSA detectable, we discussed this is a biochemical recurrence.   We did discuss that this does increase his risk of having recurrence of would have to be treated in years to come.  Patient voiced understanding at  this time we will follow this conservatively.  PAtient understands very important to continue to follow-up    Follow-up in 6 months with PSA

## 2022-11-15 ENCOUNTER — OFFICE VISIT (OUTPATIENT)
Dept: UROLOGY | Facility: CLINIC | Age: 77
End: 2022-11-15

## 2022-11-15 VITALS — RESPIRATION RATE: 18 BRPM

## 2022-11-15 DIAGNOSIS — R97.20 ELEVATED PSA: Primary | ICD-10-CM

## 2022-11-15 PROCEDURE — 99213 OFFICE O/P EST LOW 20 MIN: CPT | Performed by: UROLOGY

## 2022-11-15 RX ORDER — LISINOPRIL 20 MG/1
TABLET ORAL
COMMUNITY
Start: 2022-03-01

## 2023-05-08 ENCOUNTER — LAB (OUTPATIENT)
Dept: LAB | Facility: HOSPITAL | Age: 78
End: 2023-05-08
Payer: MEDICARE

## 2023-05-08 DIAGNOSIS — R97.20 ELEVATED PSA: ICD-10-CM

## 2023-05-08 LAB — PSA SERPL-MCNC: 0.03 NG/ML (ref 0–4)

## 2023-05-08 PROCEDURE — 84153 ASSAY OF PSA TOTAL: CPT

## 2023-05-08 PROCEDURE — 36415 COLL VENOUS BLD VENIPUNCTURE: CPT

## 2023-05-14 PROBLEM — Z85.46 HISTORY OF PROSTATE CANCER: Status: ACTIVE | Noted: 2023-05-14

## 2023-05-14 NOTE — PROGRESS NOTES
Chief Complaint    Urologic complaint    Subjective          Romelmonty Pérez presents to [facility] UROLOGY  History of Present Illness      77-year-old  gentleman       Prostatic adenocarcinoma   RALP LND  3/20, [id number]      Incontinence unchanged.  Nighttime is a little better.  3-5 daily.  Doing okay.  Doing Kegels.     No GH    follows up today with a PSA    PVR     8/20  76    Not worried about ED. No change        Previous    Not interested in AUS    h/o balanitis. used triamcinolone/nystatin cream.  bot bothersome at this time    Tried Holland clam - hard to use.    Myrbetriq 25 -did not help, caused constipation    Patient denies AMI and CVA, non-smoker, aspirin 81, diabetes mellitus treated with metformin    Father lived to be 77, had pulmonary issues from occupational problems.    No h/o abdominal surgeries    1/20 creatinine 0.9, GFR > 60     Prostate CA    5/23          0.029   11/22        0.024   10/21      <0.014  4/21        <0.01  9/20      <0.13  5/20       <0.01    3/5/20  RALP with LND   4+5 = 9, 3 lymph nodes negative, prostatic adenocarcinoma, [id number]    1/20 bone scan negative  1/20 CT abdomen/pelvis with small hernia at the left lateral aspect of the inferior left rectus muscle containing a small focus of colon.  No signs of lymphadenopathy or metastatic disease    1/7/2020 MRI fusion prostate biopsy  Right base, left base, right mid, left mid, right apex negative  Left apex 4+5, 1/2, 30%  Region of interest 4+3, 4/4, 48%    10/19 MRI prostate 56, lesion in the left lateral peripheral zone at the apex PI RADS 5.  Size is 20 mm x 11 mm no gina extraprostatic extension seen.  Margin does abut prostatic capsule.  11/19    8.6, percent free 10.1% (40% chance)  - on finasteride  9/19:    10.041  3/2014:  1.42  2/2013   1.00    7/2011:  0.60   10/2010:  0.58   10/2009:  .49   10/2008  .48   10/2007 1.388  2006:    1.31     Past History:  Medical History: has no past  medical history on file.   Surgical History: has no past surgical history on file.   Family History: family history is not on file.   Social History: reports that he has never smoked. He has never used smokeless tobacco. He reports that he does not currently use alcohol. He reports that he does not use drugs.  Allergies: Patient has no known allergies.       Current Outpatient Medications:   •  aspirin 81 MG chewable tablet, Chew 81 mg Daily., Disp: , Rfl:   •  finasteride (PROSCAR) 5 MG tablet, Take 5 mg by mouth Daily., Disp: , Rfl:   •  fluticasone (FLONASE) 50 MCG/ACT nasal spray, 1 spray by Each Nare route Daily. Shake before using., Disp: , Rfl:   •  hydroCHLOROthiazide (MICROZIDE) 12.5 MG capsule, Take 12.5 mg by mouth Daily., Disp: , Rfl:   •  lisinopril (PRINIVIL,ZESTRIL) 20 MG tablet, , Disp: , Rfl:   •  magnesium gluconate (MAGONATE) 500 MG tablet, Take 27 mg by mouth 2 (Two) Times a Day., Disp: , Rfl:   •  metFORMIN (GLUCOPHAGE) 1000 MG tablet, Take 1,000 mg by mouth 2 (Two) Times a Day With Meals., Disp: , Rfl:   •  metoprolol succinate XL (TOPROL-XL) 100 MG 24 hr tablet, Take 100 mg by mouth Daily., Disp: , Rfl:   •  omeprazole (priLOSEC) 40 MG capsule, Take 40 mg by mouth Daily., Disp: , Rfl:   •  psyllium (METAMUCIL) 58.6 % packet, Take 1 packet by mouth Daily., Disp: , Rfl:   •  simvastatin (ZOCOR) 10 MG tablet, Take 10 mg by mouth Every Night., Disp: , Rfl:   •  Stahist AD 25-60 MG tablet, TAKE 1 TABLET BY MOUTH EVERY 6 HOURS AS NEEDED FOR COLD OR SINUS OR SYMPTOMS, Disp: , Rfl:          Results for orders placed or performed in visit on 05/08/23   PSA Diagnostic    Specimen: Blood   Result Value Ref Range    PSA 0.029 0.000 - 4.000 ng/mL        Objective     Vital Signs:   There were no vitals taken for this visit.             Assessment and Plan    Diagnoses and all orders for this visit:    1. History of prostate cancer (Primary)        H/o Prostate cancer    PSA detectable,biochemical  recurrence.  Stable.     PSA stable, will follow conservatively.    Patient understands we are continue to follow biochemical recurrence he must follow-up.  All questions answered today    Follow-up in 6 months with PSA

## 2023-05-16 ENCOUNTER — OFFICE VISIT (OUTPATIENT)
Dept: UROLOGY | Facility: CLINIC | Age: 78
End: 2023-05-16
Payer: MEDICARE

## 2023-05-16 VITALS — BODY MASS INDEX: 30.18 KG/M2 | HEIGHT: 74 IN | WEIGHT: 235.2 LBS

## 2023-05-16 DIAGNOSIS — Z85.46 HISTORY OF PROSTATE CANCER: Primary | ICD-10-CM

## 2023-05-16 PROCEDURE — 1160F RVW MEDS BY RX/DR IN RCRD: CPT | Performed by: UROLOGY

## 2023-05-16 PROCEDURE — 1159F MED LIST DOCD IN RCRD: CPT | Performed by: UROLOGY

## 2023-05-16 PROCEDURE — 99213 OFFICE O/P EST LOW 20 MIN: CPT | Performed by: UROLOGY

## 2023-05-16 RX ORDER — HYDROCHLOROTHIAZIDE 12.5 MG/1
25 CAPSULE, GELATIN COATED ORAL
COMMUNITY

## 2023-05-16 RX ORDER — SIMVASTATIN 20 MG
20 TABLET ORAL
COMMUNITY

## 2023-05-16 RX ORDER — MULTIVIT WITH MINERALS/LUTEIN
1000 TABLET ORAL DAILY
COMMUNITY

## 2023-05-16 RX ORDER — MAGNESIUM OXIDE 400 MG/1
400 TABLET ORAL DAILY
COMMUNITY

## 2023-05-16 RX ORDER — MELATONIN
1000 DAILY
COMMUNITY

## 2023-11-14 ENCOUNTER — LAB (OUTPATIENT)
Dept: LAB | Facility: HOSPITAL | Age: 78
End: 2023-11-14
Payer: MEDICARE

## 2023-11-14 DIAGNOSIS — Z85.46 HISTORY OF PROSTATE CANCER: ICD-10-CM

## 2023-11-14 LAB — PSA SERPL-MCNC: 0.05 NG/ML (ref 0–4)

## 2023-11-14 PROCEDURE — 84153 ASSAY OF PSA TOTAL: CPT

## 2023-11-14 PROCEDURE — 36415 COLL VENOUS BLD VENIPUNCTURE: CPT

## 2023-11-19 NOTE — PROGRESS NOTES
Chief Complaint    Urologic complaint    Subjective          Romel Pérez presents to [facility] UROLOGY  History of Present Illness      78-year-old  gentleman       Prostatic adenocarcinoma  RALP LND  3/20, [id number]      Incontinence about the same.    3-5 pads daily.  Doing okay.      No GH/dysuria/UTI    follows up today with a PSA    PVR     8/20  76    Not worried about ED. No change    No major changes.    Previous    Not interested in AUS    h/o balanitis. used triamcinolone/nystatin cream.  bot bothersome at this time    Tried Holland clam - hard to use.    Myrbetriq 25 -did not help, caused constipation    Patient denies AMI and CVA, non-smoker, aspirin 81, diabetes mellitus treated with metformin    Father lived to be 77, had pulmonary issues from occupational problems.    No h/o abdominal surgeries    1/20 creatinine 0.9, GFR > 60       Prostate CA    11/23        0.047   5/23          0.029   11/22        0.024   10/21      <0.014  4/21        <0.01  9/20        <0.13  5/20        <0.01    3/5/20  RALP with LND   4+5 = 9, 3 lymph nodes negative, prostatic adenocarcinoma, [id number]    1/20 bone scan negative  1/20 CT abdomen/pelvis with small hernia at the left lateral aspect of the inferior left rectus muscle containing a small focus of colon.  No signs of lymphadenopathy or metastatic disease    1/7/2020 MRI fusion prostate biopsy  Right base, left base, right mid, left mid, right apex negative  Left apex 4+5, 1/2, 30%  Region of interest 4+3, 4/4, 48%    10/19 MRI prostate 58, lesion in the left lateral peripheral zone at the apex PI RADS 5.  Size is 20 mm x 11 mm no gina extraprostatic extension seen.  Margin does abut prostatic capsule.  11/19    8.6, percent free 10.1% (40% chance)  - on finasteride  9/19:    10.041  3/2014:  1.42  2/2013   1.00    7/2011:  0.60   10/2010:  0.58   10/2009:  .49   10/2008  .48   10/2007 1.388  2006:    1.31     Past History:  Medical  History: has no past medical history on file.   Surgical History: has no past surgical history on file.   Family History: family history is not on file.   Social History: reports that he has never smoked. He has never been exposed to tobacco smoke. He has never used smokeless tobacco. He reports that he does not currently use alcohol. He reports that he does not use drugs.  Allergies: Patient has no known allergies.       Current Outpatient Medications:     aspirin 81 MG chewable tablet, Chew 81 mg Daily., Disp: , Rfl:     Cholecalciferol 25 MCG (1000 UT) tablet, Take 1 tablet by mouth Daily., Disp: , Rfl:     finasteride (PROSCAR) 5 MG tablet, Take 5 mg by mouth Daily., Disp: , Rfl:     fluticasone (FLONASE) 50 MCG/ACT nasal spray, 1 spray by Each Nare route Daily. Shake before using., Disp: , Rfl:     hydroCHLOROthiazide (MICROZIDE) 12.5 MG capsule, Take 2 capsules by mouth., Disp: , Rfl:     lisinopril (PRINIVIL,ZESTRIL) 20 MG tablet, , Disp: , Rfl:     magnesium oxide (MAG-OX) 400 MG tablet, Take 1 tablet by mouth Daily., Disp: , Rfl:     metFORMIN (GLUCOPHAGE) 1000 MG tablet, Take 1,000 mg by mouth 2 (Two) Times a Day With Meals., Disp: , Rfl:     metoprolol succinate XL (TOPROL-XL) 100 MG 24 hr tablet, Take 100 mg by mouth Daily., Disp: , Rfl:     omeprazole (priLOSEC) 40 MG capsule, Take 40 mg by mouth Daily., Disp: , Rfl:     psyllium (METAMUCIL) 58.6 % packet, Take 1 packet by mouth Daily., Disp: , Rfl:     simvastatin (ZOCOR) 20 MG tablet, Take 1 tablet by mouth., Disp: , Rfl:     Stahist AD 25-60 MG tablet, TAKE 1 TABLET BY MOUTH EVERY 6 HOURS AS NEEDED FOR COLD OR SINUS OR SYMPTOMS, Disp: , Rfl:     vitamin C (ASCORBIC ACID) 250 MG tablet, Take 4 tablets by mouth Daily., Disp: , Rfl:          Results for orders placed or performed in visit on 11/14/23   PSA DIAGNOSTIC    Specimen: Blood   Result Value Ref Range    PSA 0.047 0.000 - 4.000 ng/mL        Objective     Vital Signs:   There were no vitals  taken for this visit.             Assessment and Plan    Diagnoses and all orders for this visit:    1. History of prostate cancer (Primary)        H/o Prostate cancer    PSA detectable,biochemical recurrence.    PSA has almost doubled.    We discussed this today.    PSA in 4 months      Patient understands we  will continue to follow his biochemical recurrence and  he must follow-up.  All questions answered today

## 2023-11-21 ENCOUNTER — OFFICE VISIT (OUTPATIENT)
Dept: UROLOGY | Facility: CLINIC | Age: 78
End: 2023-11-21
Payer: MEDICARE

## 2023-11-21 VITALS — HEIGHT: 74 IN | BODY MASS INDEX: 30.16 KG/M2 | WEIGHT: 235 LBS

## 2023-11-21 DIAGNOSIS — Z85.46 HISTORY OF PROSTATE CANCER: Primary | ICD-10-CM

## 2024-03-13 ENCOUNTER — LAB (OUTPATIENT)
Dept: LAB | Facility: HOSPITAL | Age: 79
End: 2024-03-13
Payer: MEDICARE

## 2024-03-13 DIAGNOSIS — Z85.46 HISTORY OF PROSTATE CANCER: ICD-10-CM

## 2024-03-13 LAB — PSA SERPL-MCNC: 0.05 NG/ML (ref 0–4)

## 2024-03-13 PROCEDURE — 36415 COLL VENOUS BLD VENIPUNCTURE: CPT

## 2024-03-13 PROCEDURE — 84153 ASSAY OF PSA TOTAL: CPT

## 2024-03-18 NOTE — PROGRESS NOTES
Chief Complaint    Urologic complaint    Subjective          Romelmonty Pérez presents to [facility] UROLOGY  History of Present Illness      78-year-old  gentleman       Prostatic adenocarcinoma  RALP LND  3/20, [id number] -biochemical recurrence      Incontinence stable.   8 pads daily. Not that bothered    No GH      PVR     8/20  76    Not worried about ED.       Previous    Not interested in AUS    h/o balanitis. used triamcinolone/nystatin cream.  bot bothersome at this time    Tried Holland clam - hard to use.    Myrbetriq 25 -did not help, caused constipation    Patient denies AMI and CVA, non-smoker, aspirin 81, diabetes mellitus treated with metformin    Father lived to be 77, had pulmonary issues from occupational problems.    No h/o abdominal surgeries    1/20 creatinine 0.9, GFR > 60       Prostate CA    3/24          0.04   11/23        0.047   5/23          0.029   11/22        0.024   10/21      <0.014  4/21        <0.01  9/20        <0.13  5/20        <0.01    3/5/20  RALP with LND   4+5 = 9, 3 lymph nodes negative, prostatic adenocarcinoma, [id number]    1/20 bone scan negative  1/20 CT abdomen/pelvis with small hernia at the left lateral aspect of the inferior left rectus muscle containing a small focus of colon.  No signs of lymphadenopathy or metastatic disease    1/7/2020 MRI fusion prostate biopsy  Right base, left base, right mid, left mid, right apex negative  Left apex 4+5, 1/2, 30%  Region of interest 4+3, 4/4, 48%    10/19 MRI prostate 58, lesion in the left lateral peripheral zone at the apex PI RADS 5.  Size is 20 mm x 11 mm no gina extraprostatic extension seen.  Margin does abut prostatic capsule.  11/19    8.6, percent free 10.1% (40% chance)  - on finasteride  9/19:    10.041  3/2014:  1.42  2/2013   1.00    7/2011:  0.60   10/2010:  0.58   10/2009:  .49   10/2008  .48   10/2007 1.388  2006:    1.31     Past History:  Medical History: has no past medical history  on file.   Surgical History: has no past surgical history on file.   Family History: family history is not on file.   Social History: reports that he has never smoked. He has never been exposed to tobacco smoke. He has never used smokeless tobacco. He reports that he does not currently use alcohol. He reports that he does not use drugs.  Allergies: Patient has no known allergies.       Current Outpatient Medications:     aspirin 81 MG chewable tablet, Chew 1 tablet Daily., Disp: , Rfl:     Cholecalciferol 25 MCG (1000 UT) tablet, Take 1 tablet by mouth Daily., Disp: , Rfl:     finasteride (PROSCAR) 5 MG tablet, Take 1 tablet by mouth Daily., Disp: , Rfl:     fluticasone (FLONASE) 50 MCG/ACT nasal spray, 1 spray by Each Nare route Daily. Shake before using., Disp: , Rfl:     hydroCHLOROthiazide (MICROZIDE) 12.5 MG capsule, Take 2 capsules by mouth., Disp: , Rfl:     lisinopril (PRINIVIL,ZESTRIL) 20 MG tablet, , Disp: , Rfl:     magnesium oxide (MAG-OX) 400 MG tablet, Take 1 tablet by mouth Daily., Disp: , Rfl:     metFORMIN (GLUCOPHAGE) 1000 MG tablet, Take 1 tablet by mouth 2 (Two) Times a Day With Meals., Disp: , Rfl:     metoprolol succinate XL (TOPROL-XL) 100 MG 24 hr tablet, Take 1 tablet by mouth Daily., Disp: , Rfl:     omeprazole (priLOSEC) 40 MG capsule, Take 1 capsule by mouth Daily., Disp: , Rfl:     psyllium (METAMUCIL) 58.6 % packet, Take 1 packet by mouth Daily., Disp: , Rfl:     simvastatin (ZOCOR) 20 MG tablet, Take 1 tablet by mouth., Disp: , Rfl:     Stahist AD 25-60 MG tablet, TAKE 1 TABLET BY MOUTH EVERY 6 HOURS AS NEEDED FOR COLD OR SINUS OR SYMPTOMS, Disp: , Rfl:     vitamin C (ASCORBIC ACID) 250 MG tablet, Take 4 tablets by mouth Daily., Disp: , Rfl:          Results for orders placed or performed in visit on 03/13/24   PSA DIAGNOSTIC    Specimen: Blood   Result Value Ref Range    PSA 0.048 0.000 - 4.000 ng/mL        Objective     Vital Signs:   There were no vitals taken for this visit.              Assessment and Plan    Diagnoses and all orders for this visit:    1. History of prostate cancer (Primary)        H/o Prostate cancer    PSA detectable,biochemical recurrence.    PSA stable continue to monitor conservatively    We discussed this today.    PSA in 6 months

## 2024-03-22 ENCOUNTER — OFFICE VISIT (OUTPATIENT)
Dept: UROLOGY | Facility: CLINIC | Age: 79
End: 2024-03-22
Payer: MEDICARE

## 2024-03-22 VITALS — RESPIRATION RATE: 16 BRPM | WEIGHT: 230 LBS | HEIGHT: 74 IN | BODY MASS INDEX: 29.52 KG/M2

## 2024-03-22 DIAGNOSIS — Z85.46 HISTORY OF PROSTATE CANCER: Primary | ICD-10-CM

## 2024-09-11 ENCOUNTER — LAB (OUTPATIENT)
Dept: LAB | Facility: HOSPITAL | Age: 79
End: 2024-09-11
Payer: MEDICARE

## 2024-09-11 DIAGNOSIS — Z85.46 HISTORY OF PROSTATE CANCER: ICD-10-CM

## 2024-09-11 LAB — PSA SERPL-MCNC: 0.06 NG/ML (ref 0–4)

## 2024-09-11 PROCEDURE — 36415 COLL VENOUS BLD VENIPUNCTURE: CPT

## 2024-09-11 PROCEDURE — 84153 ASSAY OF PSA TOTAL: CPT

## 2024-09-23 ENCOUNTER — OFFICE VISIT (OUTPATIENT)
Dept: UROLOGY | Facility: CLINIC | Age: 79
End: 2024-09-23
Payer: MEDICARE

## 2024-09-23 VITALS — HEIGHT: 74 IN | WEIGHT: 231.2 LBS | BODY MASS INDEX: 29.67 KG/M2 | RESPIRATION RATE: 18 BRPM

## 2024-09-23 DIAGNOSIS — Z85.46 HISTORY OF PROSTATE CANCER: Primary | ICD-10-CM

## 2024-09-23 PROCEDURE — 1160F RVW MEDS BY RX/DR IN RCRD: CPT | Performed by: UROLOGY

## 2024-09-23 PROCEDURE — 1159F MED LIST DOCD IN RCRD: CPT | Performed by: UROLOGY

## 2024-09-23 PROCEDURE — 99213 OFFICE O/P EST LOW 20 MIN: CPT | Performed by: UROLOGY

## 2024-09-23 RX ORDER — LANOLIN ALCOHOL/MO/W.PET/CERES
CREAM (GRAM) TOPICAL
COMMUNITY
Start: 2024-07-07

## 2024-09-23 RX ORDER — CETIRIZINE HYDROCHLORIDE 10 MG/1
10 TABLET ORAL DAILY
COMMUNITY

## 2024-09-23 RX ORDER — AMLODIPINE BESYLATE 5 MG/1
5 TABLET ORAL DAILY
COMMUNITY
Start: 2024-07-08

## 2025-03-18 ENCOUNTER — LAB (OUTPATIENT)
Dept: LAB | Facility: HOSPITAL | Age: 80
End: 2025-03-18
Payer: MEDICARE

## 2025-03-18 DIAGNOSIS — Z85.46 HISTORY OF PROSTATE CANCER: ICD-10-CM

## 2025-03-18 LAB — PSA SERPL-MCNC: 0.13 NG/ML (ref 0–4)

## 2025-03-18 PROCEDURE — 36415 COLL VENOUS BLD VENIPUNCTURE: CPT

## 2025-03-18 PROCEDURE — 84153 ASSAY OF PSA TOTAL: CPT

## 2025-03-25 ENCOUNTER — OFFICE VISIT (OUTPATIENT)
Dept: UROLOGY | Age: 80
End: 2025-03-25
Payer: MEDICARE

## 2025-03-25 DIAGNOSIS — Z85.46 HISTORY OF PROSTATE CANCER: Primary | ICD-10-CM

## 2025-03-25 DIAGNOSIS — R97.21 RISING PSA FOLLOWING TREATMENT FOR MALIGNANT NEOPLASM OF PROSTATE: ICD-10-CM

## 2025-03-25 PROCEDURE — 1159F MED LIST DOCD IN RCRD: CPT | Performed by: NURSE PRACTITIONER

## 2025-03-25 PROCEDURE — 99213 OFFICE O/P EST LOW 20 MIN: CPT | Performed by: NURSE PRACTITIONER

## 2025-03-25 PROCEDURE — 1160F RVW MEDS BY RX/DR IN RCRD: CPT | Performed by: NURSE PRACTITIONER

## 2025-03-25 NOTE — PROGRESS NOTES
Chief Complaint: History of prostate cancer    Subjective         History of Present Illness  Romel Pérez is a 79 y.o. male presents to [facility] UROLOGY to be seen for f/u prostate caner.  Patient is s/p RALP LND  3/20, [id number] -biochemical recurrence    Patient returns with a Psa from 3/18/25 which is now 0.127     Incontinence stable.       6 - 8 pads daily. Not that bothered.     No GH/dysuria          Previous:        Not worried about ED.      Not interested in AUS     h/o balanitis. used triamcinolone/nystatin cream.  bot bothersome at this time     Tried Holland clam - hard to use.     Myrbetriq 25 -did not help, caused constipation     Patient denies AMI and CVA, non-smoker, aspirin 81, diabetes mellitus treated with metformin     Father lived to be 77, had pulmonary issues from occupational problems.     No h/o abdominal surgeries     1/20 creatinine 0.9, GFR > 60            Prostate CA     9/24         0.06   3/24          0.04   11/23        0.047   5/23          0.029   11/22        0.024   10/21      <0.014  4/21        <0.01  9/20        <0.13  5/20        <0.01     3/5/20  RALP with LND   4+5 = 9, 3 lymph nodes negative, prostatic adenocarcinoma, [id number]     1/20 bone scan negative  1/20 CT abdomen/pelvis with small hernia at the left lateral aspect of the inferior left rectus muscle containing a small focus of colon.  No signs of lymphadenopathy or metastatic disease     1/7/2020 MRI fusion prostate biopsy  Right base, left base, right mid, left mid, right apex negative  Left apex 4+5, 1/2, 30%  Region of interest 4+3, 4/4, 48%     10/19 MRI prostate 58, lesion in the left lateral peripheral zone at the apex PI RADS 5.  Size is 20 mm x 11 mm no gina extraprostatic extension seen.  Margin does abut prostatic capsule.  11/19    8.6, percent free 10.1% (40% chance)  - on finasteride  9/19:    10.041  3/2014:  1.42  2/2013   1.00    7/2011:  0.60   10/2010:  0.58   10/2009:  .49    10/2008  .48   10/2007 1.388  2006:    1.31     Objective     History reviewed. No pertinent past medical history.    Past Surgical History:   Procedure Laterality Date    PROSTATE SURGERY  March 2020         Current Outpatient Medications:     amLODIPine (NORVASC) 5 MG tablet, Take 1 tablet by mouth Daily., Disp: , Rfl:     aspirin 81 MG chewable tablet, Chew 1 tablet Daily., Disp: , Rfl:     cetirizine (zyrTEC) 10 MG tablet, Take 1 tablet by mouth Daily., Disp: , Rfl:     Cholecalciferol 25 MCG (1000 UT) tablet, Take 1 tablet by mouth Daily., Disp: , Rfl:     fluticasone (FLONASE) 50 MCG/ACT nasal spray, 1 spray by Each Nare route Daily. Shake before using., Disp: , Rfl:     lisinopril (PRINIVIL,ZESTRIL) 20 MG tablet, , Disp: , Rfl:     magnesium oxide (MAG-OX) 400 MG tablet, Take 1 tablet by mouth Daily., Disp: , Rfl:     metFORMIN (GLUCOPHAGE) 1000 MG tablet, Take 1 tablet by mouth 2 (Two) Times a Day With Meals., Disp: , Rfl:     metoprolol succinate XL (TOPROL-XL) 100 MG 24 hr tablet, Take 1 tablet by mouth Daily., Disp: , Rfl:     omeprazole (priLOSEC) 40 MG capsule, Take 1 capsule by mouth Daily., Disp: , Rfl:     simvastatin (ZOCOR) 20 MG tablet, Take 1 tablet by mouth., Disp: , Rfl:     vitamin B-12 (CYANOCOBALAMIN) 1000 MCG tablet, , Disp: , Rfl:     vitamin C (ASCORBIC ACID) 250 MG tablet, Take 4 tablets by mouth Daily., Disp: , Rfl:     finasteride (PROSCAR) 5 MG tablet, Take 1 tablet by mouth Daily. (Patient not taking: Reported on 3/25/2025), Disp: , Rfl:     No Known Allergies     Family History   Problem Relation Age of Onset    Cancer Mother        Social History     Socioeconomic History    Marital status:    Tobacco Use    Smoking status: Never     Passive exposure: Never    Smokeless tobacco: Never   Vaping Use    Vaping status: Never Used   Substance and Sexual Activity    Alcohol use: Never    Drug use: Never    Sexual activity: Not Currently     Partners: Female       Vital Signs:    There were no vitals taken for this visit.     Physical Exam     Result Review :   The following data was reviewed by: GRAEME Hernandez on 03/25/2025:  Results for orders placed or performed in visit on 03/18/25   PSA DIAGNOSTIC    Collection Time: 03/18/25 10:16 AM    Specimen: Blood   Result Value Ref Range    PSA 0.127 0.000 - 4.000 ng/mL      PSA          9/11/2024    13:55 3/18/2025    10:16   PSA   PSA 0.060  0.127          Procedures        Assessment and Plan    Diagnoses and all orders for this visit:    1. History of prostate cancer (Primary)  -     PSA DIAGNOSTIC; Future    2. Rising PSA following treatment for malignant neoplasm of prostate        Psa has doubled since his last visit we will plan to follow this closer.     Will f/u in 3 months or sooner if needed.      I spent 11 minutes caring for Romel on this date of service. This time includes time spent by me in the following activities:reviewing tests, obtaining and/or reviewing a separately obtained history, performing a medically appropriate examination and/or evaluation , counseling and educating the patient/family/caregiver, ordering medications, tests, or procedures, and documenting information in the medical record  Follow Up   Return in about 3 months (around 6/25/2025) for f/u rising psa with PSA .  Patient was given instructions and counseling regarding his condition or for health maintenance advice. Please see specific information pulled into the AVS if appropriate.         This document has been electronically signed by GRAEME Hernandez  March 25, 2025 10:59 EDT

## 2025-06-09 ENCOUNTER — LAB (OUTPATIENT)
Dept: LAB | Facility: HOSPITAL | Age: 80
End: 2025-06-09
Payer: MEDICARE

## 2025-06-09 DIAGNOSIS — Z85.46 HISTORY OF PROSTATE CANCER: ICD-10-CM

## 2025-06-09 LAB — PSA SERPL-MCNC: 0.14 NG/ML (ref 0–4)

## 2025-06-09 PROCEDURE — 36415 COLL VENOUS BLD VENIPUNCTURE: CPT

## 2025-06-09 PROCEDURE — 84153 ASSAY OF PSA TOTAL: CPT

## 2025-06-24 ENCOUNTER — OFFICE VISIT (OUTPATIENT)
Dept: UROLOGY | Age: 80
End: 2025-06-24
Payer: MEDICARE

## 2025-06-24 VITALS — HEIGHT: 74 IN | BODY MASS INDEX: 29.52 KG/M2 | WEIGHT: 230 LBS

## 2025-06-24 DIAGNOSIS — R97.21 RISING PSA FOLLOWING TREATMENT FOR MALIGNANT NEOPLASM OF PROSTATE: ICD-10-CM

## 2025-06-24 DIAGNOSIS — Z85.46 HISTORY OF PROSTATE CANCER: Primary | ICD-10-CM

## 2025-06-24 PROCEDURE — 1160F RVW MEDS BY RX/DR IN RCRD: CPT | Performed by: NURSE PRACTITIONER

## 2025-06-24 PROCEDURE — 99213 OFFICE O/P EST LOW 20 MIN: CPT | Performed by: NURSE PRACTITIONER

## 2025-06-24 PROCEDURE — G2211 COMPLEX E/M VISIT ADD ON: HCPCS | Performed by: NURSE PRACTITIONER

## 2025-06-24 PROCEDURE — 1159F MED LIST DOCD IN RCRD: CPT | Performed by: NURSE PRACTITIONER

## 2025-06-24 NOTE — PROGRESS NOTES
Chief Complaint: Prostate Cancer (Patient denies any urinary issues, he is curious on his PSA levels. )    Subjective         Prostate Cancer    Romel Pérez is a 80 y.o. male presents to [facility] UROLOGY to be seen for f/u prostate caner.    Patient is s/p RALP LND  3/20, [id number] -biochemical recurrence    Patient returns with a Psa from 6/9/25 which is now 0.140.    He denies bony pain.     Still using pads for incontinence.      Previous:     Patient is s/p RALP LND  3/20, [id number] -biochemical recurrence    Patient returns with a Psa from 3/18/25 which is now 0.127     Incontinence stable.       6 - 8 pads daily. Not that bothered.     No GH/dysuria          Previous:        Not worried about ED.      Not interested in AUS     h/o balanitis. used triamcinolone/nystatin cream.  bot bothersome at this time     Tried Holland clam - hard to use.     Myrbetriq 25 -did not help, caused constipation     Patient denies AMI and CVA, non-smoker, aspirin 81, diabetes mellitus treated with metformin     Father lived to be 77, had pulmonary issues from occupational problems.     No h/o abdominal surgeries     1/20 creatinine 0.9, GFR > 60            Prostate CA     9/24         0.06   3/24          0.04   11/23        0.047   5/23          0.029   11/22        0.024   10/21      <0.014  4/21        <0.01  9/20        <0.13  5/20        <0.01     3/5/20  RALP with LND   4+5 = 9, 3 lymph nodes negative, prostatic adenocarcinoma, [id number]     1/20 bone scan negative  1/20 CT abdomen/pelvis with small hernia at the left lateral aspect of the inferior left rectus muscle containing a small focus of colon.  No signs of lymphadenopathy or metastatic disease     1/7/2020 MRI fusion prostate biopsy  Right base, left base, right mid, left mid, right apex negative  Left apex 4+5, 1/2, 30%  Region of interest 4+3, 4/4, 48%     10/19 MRI prostate 58, lesion in the left lateral peripheral zone at the apex PI RADS 5.   Size is 20 mm x 11 mm no gina extraprostatic extension seen.  Margin does abut prostatic capsule.  11/19    8.6, percent free 10.1% (40% chance)  - on finasteride  9/19:    10.041  3/2014:  1.42  2/2013   1.00    7/2011:  0.60   10/2010:  0.58   10/2009:  .49   10/2008  .48   10/2007 1.388  2006:    1.31     Objective     History reviewed. No pertinent past medical history.    Past Surgical History:   Procedure Laterality Date    PROSTATE SURGERY  March 2020         Current Outpatient Medications:     amLODIPine (NORVASC) 5 MG tablet, Take 1 tablet by mouth Daily., Disp: , Rfl:     aspirin 81 MG chewable tablet, Chew 1 tablet Daily., Disp: , Rfl:     cetirizine (zyrTEC) 10 MG tablet, Take 1 tablet by mouth Daily., Disp: , Rfl:     Cholecalciferol 25 MCG (1000 UT) tablet, Take 1 tablet by mouth Daily., Disp: , Rfl:     fluticasone (FLONASE) 50 MCG/ACT nasal spray, 1 spray by Each Nare route Daily. Shake before using., Disp: , Rfl:     lisinopril (PRINIVIL,ZESTRIL) 20 MG tablet, , Disp: , Rfl:     magnesium oxide (MAG-OX) 400 MG tablet, Take 1 tablet by mouth Daily., Disp: , Rfl:     metFORMIN (GLUCOPHAGE) 1000 MG tablet, Take 1 tablet by mouth 2 (Two) Times a Day With Meals., Disp: , Rfl:     metoprolol succinate XL (TOPROL-XL) 100 MG 24 hr tablet, Take 1 tablet by mouth Daily., Disp: , Rfl:     omeprazole (priLOSEC) 40 MG capsule, Take 1 capsule by mouth Daily., Disp: , Rfl:     simvastatin (ZOCOR) 20 MG tablet, Take 1 tablet by mouth., Disp: , Rfl:     vitamin B-12 (CYANOCOBALAMIN) 1000 MCG tablet, , Disp: , Rfl:     vitamin C (ASCORBIC ACID) 250 MG tablet, Take 4 tablets by mouth Daily., Disp: , Rfl:     No Known Allergies     Family History   Problem Relation Age of Onset    Cancer Mother        Social History     Socioeconomic History    Marital status:    Tobacco Use    Smoking status: Never     Passive exposure: Never    Smokeless tobacco: Never   Vaping Use    Vaping status: Never Used   Substance and  "Sexual Activity    Alcohol use: Never    Drug use: Never    Sexual activity: Not Currently     Partners: Female       Vital Signs:   Ht 188 cm (74\")   Wt 104 kg (230 lb)   BMI 29.53 kg/m²      Physical Exam     Result Review :   The following data was reviewed by: GRAEME Hernandez on 06/24/2026:  Results for orders placed or performed in visit on 06/09/25   PSA DIAGNOSTIC    Collection Time: 06/09/25 10:21 AM    Specimen: Blood   Result Value Ref Range    PSA 0.140 0.000 - 4.000 ng/mL      PSA          9/11/2024    13:55 3/18/2025    10:16 6/9/2025    10:21   PSA   PSA 0.060  0.127  0.140          Procedures        Assessment and Plan    Diagnoses and all orders for this visit:    1. History of prostate cancer (Primary)    2. Rising PSA following treatment for malignant neoplasm of prostate  -     PSA DIAGNOSTIC; Future        Psa has continued to rise minimally.    No signs of metastatic dz.     Will f/u in 3 months or sooner if needed.      I spent 10 minutes caring for Romel on this date of service. This time includes time spent by me in the following activities:reviewing tests, obtaining and/or reviewing a separately obtained history, performing a medically appropriate examination and/or evaluation , counseling and educating the patient/family/caregiver, ordering medications, tests, or procedures, and documenting information in the medical record  Follow Up   Return in about 3 months (around 9/24/2025) for f/u rising psa after prostate cancer with PSA prior.  Patient was given instructions and counseling regarding his condition or for health maintenance advice. Please see specific information pulled into the AVS if appropriate.         This document has been electronically signed by GRAEME Hernandez  June 24, 2025 11:24 EDT       "